# Patient Record
Sex: MALE | Race: WHITE | NOT HISPANIC OR LATINO | URBAN - METROPOLITAN AREA
[De-identification: names, ages, dates, MRNs, and addresses within clinical notes are randomized per-mention and may not be internally consistent; named-entity substitution may affect disease eponyms.]

---

## 2024-05-23 ENCOUNTER — OFFICE (OUTPATIENT)
Dept: URBAN - METROPOLITAN AREA CLINIC 15 | Facility: CLINIC | Age: 64
End: 2024-05-23
Payer: MEDICAID

## 2024-05-23 VITALS
DIASTOLIC BLOOD PRESSURE: 58 MMHG | OXYGEN SATURATION: 100 % | HEIGHT: 74 IN | HEART RATE: 52 BPM | WEIGHT: 162 LBS | TEMPERATURE: 98.6 F | SYSTOLIC BLOOD PRESSURE: 92 MMHG

## 2024-05-23 DIAGNOSIS — R10.9 UNSPECIFIED ABDOMINAL PAIN: ICD-10-CM

## 2024-05-23 DIAGNOSIS — R63.4 ABNORMAL WEIGHT LOSS: ICD-10-CM

## 2024-05-23 DIAGNOSIS — R93.5 ABNORMAL FINDINGS ON DIAGNOSTIC IMAGING OF OTHER ABDOMINAL R: ICD-10-CM

## 2024-05-23 DIAGNOSIS — K21.9 GASTRO-ESOPHAGEAL REFLUX DISEASE WITHOUT ESOPHAGITIS: ICD-10-CM

## 2024-05-23 DIAGNOSIS — D64.9 ANEMIA, UNSPECIFIED: ICD-10-CM

## 2024-05-23 PROCEDURE — 99214 OFFICE O/P EST MOD 30 MIN: CPT | Performed by: NURSE PRACTITIONER

## 2025-02-12 ENCOUNTER — HOSPITAL ENCOUNTER (OUTPATIENT)
Age: 65
Discharge: HOME | End: 2025-02-12
Payer: MEDICAID

## 2025-02-12 DIAGNOSIS — M17.11: Primary | ICD-10-CM

## 2025-02-12 PROCEDURE — 73700 CT LOWER EXTREMITY W/O DYE: CPT

## 2025-02-12 NOTE — CT_ITS
PROCEDURE:  
CT EXTREMITY LOWER RIGHT WITHOUT CONTRAST  
   
REASON FOR EXAM:  
Right knee osteoarthritis.  Pre-surgical planning for total knee arthroplasty.  
   
TECHNIQUE:  
Multiple, axial CT images of the right knee were obtained without intravenous 
contrast for pre-surgical planning.  Axial images  
through the right hip and right ankle are obtained as well.  Coronal and 
sagittal 2D reformatted images were provided.  
   
COMPARISON:  
None.  
   
FINDINGS:  
There are moderate to severe tricompartment degenerative changes involving the 
right knee joint greatest along the medial knee  
joint compartment with severe joint space narrowing, marginal osteophytes, and 
subchondral sclerosis.  There is chondrocalcinosis  
of the knee joint.  There is a moderate suprapatellar knee joint effusion.  
There is a Baker's cyst measuring 4.6 x 1.3 x 1.4 cm.  
There is atrophy of the musculature.  No acute fracture or dislocation is 
identified.  
   
There are degenerative changes of the right hip joint.  Colonic diverticulosis 
is identified.  No acute findings are seen  
involving the ankle region.  There are degenerative changes with diminished bone
mineralization.  
   
ORDER #: [id number] CT/Extremity Lower without Contra  
IMPRESSION:  
1. Examination performed for pre-surgical planning.  
2. Moderate to severe tricompartment degenerative changes of the right knee renny
nt greatest involving the medial knee joint  
compartment.  
3. Moderate suprapatellar knee joint effusion.  
4. Baker's cyst.  
   
   
   
One or more dose reduction techniques were used (e.g., Automated exposure contr
ol, adjustment of the mA and/or kV according to  
patient size, use of iterative reconstruction technique).  
   
Electronically Signed By: Anibal Corey on 02/13/2025 02:17  
Reading Location: [id number]

## 2025-03-21 LAB — APTT PPP: 28.1 SECONDS (ref 24.1–36.2)

## 2025-04-01 ENCOUNTER — HOSPITAL ENCOUNTER (INPATIENT)
Dept: HOSPITAL 100 - MS3 | Age: 65
LOS: 2 days | Discharge: NURSING, REHAB OR LONG-TERM CARE | DRG: 326 | End: 2025-04-03
Payer: MEDICAID

## 2025-04-01 VITALS
HEART RATE: 54 BPM | DIASTOLIC BLOOD PRESSURE: 67 MMHG | TEMPERATURE: 98.6 F | OXYGEN SATURATION: 98 % | SYSTOLIC BLOOD PRESSURE: 120 MMHG | RESPIRATION RATE: 16 BRPM

## 2025-04-01 VITALS
DIASTOLIC BLOOD PRESSURE: 65 MMHG | TEMPERATURE: 97.8 F | RESPIRATION RATE: 16 BRPM | HEART RATE: 63 BPM | SYSTOLIC BLOOD PRESSURE: 138 MMHG | OXYGEN SATURATION: 96 %

## 2025-04-01 VITALS
OXYGEN SATURATION: 100 % | HEART RATE: 51 BPM | TEMPERATURE: 98.24 F | DIASTOLIC BLOOD PRESSURE: 65 MMHG | SYSTOLIC BLOOD PRESSURE: 138 MMHG | RESPIRATION RATE: 16 BRPM

## 2025-04-01 VITALS
HEART RATE: 58 BPM | DIASTOLIC BLOOD PRESSURE: 65 MMHG | SYSTOLIC BLOOD PRESSURE: 118 MMHG | TEMPERATURE: 98.2 F | OXYGEN SATURATION: 100 % | RESPIRATION RATE: 16 BRPM

## 2025-04-01 VITALS
DIASTOLIC BLOOD PRESSURE: 64 MMHG | TEMPERATURE: 97.7 F | SYSTOLIC BLOOD PRESSURE: 126 MMHG | OXYGEN SATURATION: 98 % | RESPIRATION RATE: 18 BRPM | HEART RATE: 54 BPM

## 2025-04-01 VITALS
HEART RATE: 60 BPM | OXYGEN SATURATION: 97 % | SYSTOLIC BLOOD PRESSURE: 116 MMHG | DIASTOLIC BLOOD PRESSURE: 70 MMHG | RESPIRATION RATE: 16 BRPM

## 2025-04-01 VITALS
OXYGEN SATURATION: 97 % | TEMPERATURE: 98.78 F | DIASTOLIC BLOOD PRESSURE: 74 MMHG | SYSTOLIC BLOOD PRESSURE: 122 MMHG | HEART RATE: 59 BPM | RESPIRATION RATE: 16 BRPM

## 2025-04-01 VITALS — BODY MASS INDEX: 24.2 KG/M2

## 2025-04-01 VITALS
SYSTOLIC BLOOD PRESSURE: 124 MMHG | OXYGEN SATURATION: 100 % | RESPIRATION RATE: 16 BRPM | HEART RATE: 56 BPM | DIASTOLIC BLOOD PRESSURE: 68 MMHG

## 2025-04-01 VITALS
RESPIRATION RATE: 16 BRPM | OXYGEN SATURATION: 99 % | HEART RATE: 65 BPM | DIASTOLIC BLOOD PRESSURE: 65 MMHG | SYSTOLIC BLOOD PRESSURE: 113 MMHG

## 2025-04-01 VITALS
RESPIRATION RATE: 16 BRPM | SYSTOLIC BLOOD PRESSURE: 138 MMHG | DIASTOLIC BLOOD PRESSURE: 72 MMHG | OXYGEN SATURATION: 100 % | HEART RATE: 60 BPM

## 2025-04-01 VITALS
RESPIRATION RATE: 16 BRPM | TEMPERATURE: 98.96 F | OXYGEN SATURATION: 98 % | HEART RATE: 53 BPM | SYSTOLIC BLOOD PRESSURE: 100 MMHG | DIASTOLIC BLOOD PRESSURE: 59 MMHG

## 2025-04-01 VITALS
OXYGEN SATURATION: 100 % | HEART RATE: 51 BPM | TEMPERATURE: 98.24 F | SYSTOLIC BLOOD PRESSURE: 138 MMHG | RESPIRATION RATE: 16 BRPM | DIASTOLIC BLOOD PRESSURE: 65 MMHG

## 2025-04-01 VITALS
SYSTOLIC BLOOD PRESSURE: 115 MMHG | TEMPERATURE: 97.88 F | OXYGEN SATURATION: 94 % | DIASTOLIC BLOOD PRESSURE: 71 MMHG | RESPIRATION RATE: 16 BRPM | HEART RATE: 80 BPM

## 2025-04-01 VITALS
DIASTOLIC BLOOD PRESSURE: 65 MMHG | RESPIRATION RATE: 16 BRPM | OXYGEN SATURATION: 100 % | SYSTOLIC BLOOD PRESSURE: 138 MMHG | HEART RATE: 58 BPM

## 2025-04-01 VITALS
HEART RATE: 57 BPM | DIASTOLIC BLOOD PRESSURE: 65 MMHG | RESPIRATION RATE: 16 BRPM | SYSTOLIC BLOOD PRESSURE: 138 MMHG | OXYGEN SATURATION: 100 %

## 2025-04-01 VITALS
RESPIRATION RATE: 16 BRPM | DIASTOLIC BLOOD PRESSURE: 70 MMHG | HEART RATE: 56 BPM | SYSTOLIC BLOOD PRESSURE: 124 MMHG | TEMPERATURE: 97.7 F | OXYGEN SATURATION: 98 %

## 2025-04-01 VITALS — HEART RATE: 56 BPM

## 2025-04-01 DIAGNOSIS — I11.0: ICD-10-CM

## 2025-04-01 DIAGNOSIS — M17.11: Primary | ICD-10-CM

## 2025-04-01 DIAGNOSIS — Z99.81: ICD-10-CM

## 2025-04-01 DIAGNOSIS — J96.10: ICD-10-CM

## 2025-04-01 DIAGNOSIS — E03.9: ICD-10-CM

## 2025-04-01 DIAGNOSIS — Z79.890: ICD-10-CM

## 2025-04-01 DIAGNOSIS — I50.30: ICD-10-CM

## 2025-04-01 DIAGNOSIS — Z87.891: ICD-10-CM

## 2025-04-01 DIAGNOSIS — M21.161: ICD-10-CM

## 2025-04-01 DIAGNOSIS — N40.0: ICD-10-CM

## 2025-04-01 DIAGNOSIS — M24.569: ICD-10-CM

## 2025-04-01 DIAGNOSIS — Z79.899: ICD-10-CM

## 2025-04-01 DIAGNOSIS — Z79.51: ICD-10-CM

## 2025-04-01 DIAGNOSIS — Z79.52: ICD-10-CM

## 2025-04-01 DIAGNOSIS — R11.2: ICD-10-CM

## 2025-04-01 PROCEDURE — 97162 PT EVAL MOD COMPLEX 30 MIN: CPT

## 2025-04-01 PROCEDURE — 88311 DECALCIFY TISSUE: CPT

## 2025-04-01 PROCEDURE — 87081 CULTURE SCREEN ONLY: CPT

## 2025-04-01 PROCEDURE — 88305 TISSUE EXAM BY PATHOLOGIST: CPT

## 2025-04-01 PROCEDURE — C1776 JOINT DEVICE (IMPLANTABLE): HCPCS

## 2025-04-01 PROCEDURE — 86900 BLOOD TYPING SEROLOGIC ABO: CPT

## 2025-04-01 PROCEDURE — 97535 SELF CARE MNGMENT TRAINING: CPT

## 2025-04-01 PROCEDURE — 80048 BASIC METABOLIC PNL TOTAL CA: CPT

## 2025-04-01 PROCEDURE — 97530 THERAPEUTIC ACTIVITIES: CPT

## 2025-04-01 PROCEDURE — 97166 OT EVAL MOD COMPLEX 45 MIN: CPT

## 2025-04-01 PROCEDURE — 86850 RBC ANTIBODY SCREEN: CPT

## 2025-04-01 PROCEDURE — 85027 COMPLETE CBC AUTOMATED: CPT

## 2025-04-01 PROCEDURE — 97110 THERAPEUTIC EXERCISES: CPT

## 2025-04-01 PROCEDURE — 86901 BLOOD TYPING SEROLOGIC RH(D): CPT

## 2025-04-01 PROCEDURE — 94668 MNPJ CHEST WALL SBSQ: CPT

## 2025-04-01 PROCEDURE — 94640 AIRWAY INHALATION TREATMENT: CPT

## 2025-04-01 PROCEDURE — 85730 THROMBOPLASTIN TIME PARTIAL: CPT

## 2025-04-01 PROCEDURE — 0SRC0JZ REPLACEMENT OF RIGHT KNEE JOINT WITH SYNTHETIC SUBSTITUTE, OPEN APPROACH: ICD-10-PCS | Performed by: ORTHOPAEDIC SURGERY

## 2025-04-01 PROCEDURE — 82962 GLUCOSE BLOOD TEST: CPT

## 2025-04-01 PROCEDURE — 36415 COLL VENOUS BLD VENIPUNCTURE: CPT

## 2025-04-01 PROCEDURE — 73560 X-RAY EXAM OF KNEE 1 OR 2: CPT

## 2025-04-01 PROCEDURE — A4216 STERILE WATER/SALINE, 10 ML: HCPCS

## 2025-04-01 RX ADMIN — DOCUSATE SODIUM 50 MG AND SENNOSIDES 8.6 MG 2 TABLET: 8.6; 5 TABLET, FILM COATED ORAL at 22:55

## 2025-04-01 RX ADMIN — CEFAZOLIN 400 GM: 10 INJECTION, POWDER, FOR SOLUTION INTRAVENOUS at 07:41

## 2025-04-01 RX ADMIN — TRANEXAMIC ACID 660 MG: 100 INJECTION, SOLUTION INTRAVENOUS at 08:28

## 2025-04-01 RX ADMIN — Medication 237 ML: at 18:15

## 2025-04-01 RX ADMIN — EPINEPHRINE 1 MG: 1 INJECTION INTRAMUSCULAR; INTRAVENOUS; SUBCUTANEOUS at 09:05

## 2025-04-01 RX ADMIN — SCOPOLAMINE 1 PATCH: 1.5 PATCH, EXTENDED RELEASE TRANSDERMAL at 06:44

## 2025-04-01 RX ADMIN — SODIUM CHLORIDE 15 ML: 9 INJECTION, SOLUTION INTRAVENOUS at 06:25

## 2025-04-01 RX ADMIN — SODIUM CHLORIDE 10 ML: 9 INJECTION, SOLUTION INTRAMUSCULAR; INTRAVENOUS; SUBCUTANEOUS at 09:05

## 2025-04-01 RX ADMIN — CEFAZOLIN 400 GM: 10 INJECTION, POWDER, FOR SOLUTION INTRAVENOUS at 13:46

## 2025-04-01 RX ADMIN — MAGNESIUM SULFATE HEPTAHYDRATE 408 GM: 500 INJECTION, SOLUTION INTRAMUSCULAR; INTRAVENOUS at 06:26

## 2025-04-01 RX ADMIN — TRANEXAMIC ACID 660 MG: 100 INJECTION, SOLUTION INTRAVENOUS at 07:46

## 2025-04-01 RX ADMIN — CEFAZOLIN 400 GM: 10 INJECTION, POWDER, FOR SOLUTION INTRAVENOUS at 22:22

## 2025-04-02 VITALS
OXYGEN SATURATION: 100 % | SYSTOLIC BLOOD PRESSURE: 129 MMHG | RESPIRATION RATE: 16 BRPM | HEART RATE: 51 BPM | TEMPERATURE: 98.06 F | DIASTOLIC BLOOD PRESSURE: 64 MMHG

## 2025-04-02 VITALS
SYSTOLIC BLOOD PRESSURE: 128 MMHG | HEART RATE: 57 BPM | OXYGEN SATURATION: 98 % | DIASTOLIC BLOOD PRESSURE: 73 MMHG | TEMPERATURE: 98.2 F | RESPIRATION RATE: 15 BRPM

## 2025-04-02 VITALS
SYSTOLIC BLOOD PRESSURE: 128 MMHG | OXYGEN SATURATION: 98 % | TEMPERATURE: 98.42 F | HEART RATE: 56 BPM | DIASTOLIC BLOOD PRESSURE: 72 MMHG | RESPIRATION RATE: 16 BRPM

## 2025-04-02 VITALS
RESPIRATION RATE: 14 BRPM | HEART RATE: 52 BPM | TEMPERATURE: 98.06 F | SYSTOLIC BLOOD PRESSURE: 118 MMHG | OXYGEN SATURATION: 97 % | DIASTOLIC BLOOD PRESSURE: 63 MMHG

## 2025-04-02 VITALS
DIASTOLIC BLOOD PRESSURE: 72 MMHG | SYSTOLIC BLOOD PRESSURE: 118 MMHG | OXYGEN SATURATION: 96 % | HEART RATE: 54 BPM | TEMPERATURE: 97.9 F | RESPIRATION RATE: 16 BRPM

## 2025-04-02 LAB
ANION GAP SERPL CALC-SCNC: 8 MMOL/L (ref 5–15)
BUN SERPL-MCNC: 24 MG/DL (ref 4–19)
BUN/CREAT SERPL: 23.9 RATIO (ref 10–20)
CALCIUM SERPL-MCNC: 8.9 MG/DL (ref 7.6–11)
CHLORIDE: 106 MMOL/L (ref 98–108)
CO2 BLDCV-SCNC: 21.2 MMOL/L (ref 21–32)
DEPRECATED RDW RBC: 45.8 FL (ref 35.1–43.9)
ERYTHROCYTE [DISTWIDTH] IN BLOOD: 12.9 % (ref 11.6–14.6)
ESTIMATED CREATININE CLEARANCE: 85.07 ML/MIN (ref 50–250)
GLUCOSE SERPL-MCNC: 133 MG/DL (ref 70–99)
HCT VFR BLD AUTO: 30.7 % (ref 40–54)
HGB BLD-MCNC: 10.5 G/DL (ref 13–16.5)
MCV RBC: 98.1 FL (ref 80–94)
MEAN CORP HGB CONC: 34.2 G/DL (ref 32–36)
MEAN PLATELET VOL.: 11.2 FL (ref 6.2–12)
PLATELET # BLD: 178 K/MM3 (ref 150–450)
RBC # BLD AUTO: 3.13 M/MM3 (ref 4.6–6.2)
WBC # BLD AUTO: 16.9 K/MM3 (ref 4.4–11)

## 2025-04-02 RX ADMIN — Medication 237 ML: at 16:24

## 2025-04-02 RX ADMIN — DOCUSATE SODIUM 50 MG AND SENNOSIDES 8.6 MG 2 TABLET: 8.6; 5 TABLET, FILM COATED ORAL at 10:39

## 2025-04-02 RX ADMIN — APIXABAN 2.5 MG: 5 TABLET, FILM COATED ORAL at 06:42

## 2025-04-02 RX ADMIN — APIXABAN 2.5 MG: 5 TABLET, FILM COATED ORAL at 20:53

## 2025-04-02 RX ADMIN — DOCUSATE SODIUM 50 MG AND SENNOSIDES 8.6 MG 2 TABLET: 8.6; 5 TABLET, FILM COATED ORAL at 20:53

## 2025-04-02 RX ADMIN — CEFAZOLIN 400 GM: 10 INJECTION, POWDER, FOR SOLUTION INTRAVENOUS at 06:41

## 2025-04-03 VITALS
OXYGEN SATURATION: 98 % | HEART RATE: 99 BPM | SYSTOLIC BLOOD PRESSURE: 131 MMHG | RESPIRATION RATE: 15 BRPM | TEMPERATURE: 97.52 F | DIASTOLIC BLOOD PRESSURE: 67 MMHG

## 2025-04-03 VITALS
SYSTOLIC BLOOD PRESSURE: 107 MMHG | HEART RATE: 73 BPM | RESPIRATION RATE: 18 BRPM | OXYGEN SATURATION: 98 % | DIASTOLIC BLOOD PRESSURE: 70 MMHG | TEMPERATURE: 98.9 F

## 2025-04-03 VITALS
SYSTOLIC BLOOD PRESSURE: 119 MMHG | DIASTOLIC BLOOD PRESSURE: 66 MMHG | OXYGEN SATURATION: 98 % | HEART RATE: 74 BPM | RESPIRATION RATE: 18 BRPM | TEMPERATURE: 97.8 F

## 2025-04-03 RX ADMIN — SODIUM CHLORIDE, PRESERVATIVE FREE 0 ML: 5 INJECTION INTRAVENOUS at 09:52

## 2025-04-03 RX ADMIN — DOCUSATE SODIUM 50 MG AND SENNOSIDES 8.6 MG 2 TABLET: 8.6; 5 TABLET, FILM COATED ORAL at 09:27

## 2025-04-03 RX ADMIN — APIXABAN 2.5 MG: 5 TABLET, FILM COATED ORAL at 09:27

## 2025-07-22 ENCOUNTER — HOSPITAL ENCOUNTER (OUTPATIENT)
Age: 65
Discharge: HOME | End: 2025-07-22
Payer: MEDICAID

## 2025-07-22 DIAGNOSIS — M17.12: Primary | ICD-10-CM

## 2025-07-22 PROCEDURE — 73721 MRI JNT OF LWR EXTRE W/O DYE: CPT
